# Patient Record
Sex: MALE | URBAN - METROPOLITAN AREA
[De-identification: names, ages, dates, MRNs, and addresses within clinical notes are randomized per-mention and may not be internally consistent; named-entity substitution may affect disease eponyms.]

---

## 2020-03-03 ENCOUNTER — APPOINTMENT (RX ONLY)
Dept: URBAN - METROPOLITAN AREA CLINIC 106 | Facility: CLINIC | Age: 70
Setting detail: DERMATOLOGY
End: 2020-03-03

## 2020-03-03 DIAGNOSIS — T69.1XX: ICD-10-CM

## 2020-03-03 PROBLEM — T69.1XXA CHILBLAINS, INITIAL ENCOUNTER: Status: ACTIVE | Noted: 2020-03-03

## 2020-03-03 PROCEDURE — ? DIAGNOSIS COMMENT

## 2020-03-03 PROCEDURE — 99202 OFFICE O/P NEW SF 15 MIN: CPT

## 2020-03-03 PROCEDURE — ? ORDER TESTS

## 2020-03-03 PROCEDURE — ? ADDITIONAL NOTES

## 2020-03-03 PROCEDURE — ? COUNSELING

## 2020-03-03 NOTE — PROCEDURE: DIAGNOSIS COMMENT
Comment: Labs today to r/o lupus pernio. The lesions have essentially resolved. The patient does not require any antibiotics, antivirals, or anti-inflammatories this point.
Detail Level: Zone

## 2020-03-03 NOTE — HPI: RASH
How Severe Is Your Rash?: mild
Is This A New Presentation, Or A Follow-Up?: Rash
Additional History: Podiatrist Dr. Melina Galaviz gave him Acyclovir for 10 days, now he is using hydrotherapy foot soak with Clindamycin capsule and lamasil pills for 20 minute soaks.

## 2020-03-03 NOTE — PROCEDURE: ORDER TESTS
Lab Facility: [id number]
Bill For Surgical Tray: no
Performing Laboratory: [id number]
Expected Date Of Service: 03/03/2020
Billing Type: Third-Party Bill